# Patient Record
Sex: FEMALE | Race: WHITE | NOT HISPANIC OR LATINO | URBAN - METROPOLITAN AREA
[De-identification: names, ages, dates, MRNs, and addresses within clinical notes are randomized per-mention and may not be internally consistent; named-entity substitution may affect disease eponyms.]

---

## 2023-10-17 ENCOUNTER — APPOINTMENT (OUTPATIENT)
Dept: RHEUMATOLOGY | Facility: CLINIC | Age: 68
End: 2023-10-17
Payer: MEDICARE

## 2023-10-17 PROBLEM — M76.31 CHRONIC ILIOTIBIAL BAND SYNDROME OF RIGHT SIDE: Status: ACTIVE | Noted: 2023-10-17

## 2023-10-17 PROBLEM — L30.9 DERMATITIS, UNSPECIFIED: Status: ACTIVE | Noted: 2023-07-24

## 2023-10-17 PROBLEM — M16.0 BILATERAL HIP JOINT ARTHRITIS: Status: ACTIVE | Noted: 2023-10-17

## 2023-10-17 PROBLEM — S76.011A MUSCLE STRAIN OF RIGHT GLUTEAL REGION: Status: ACTIVE | Noted: 2023-10-17

## 2023-10-17 PROBLEM — S76.211A: Status: ACTIVE | Noted: 2023-10-17

## 2023-10-17 RX ORDER — CETIRIZINE HYDROCHLORIDE 10 MG/1
5 TABLET ORAL DAILY
COMMUNITY

## 2023-10-17 RX ORDER — DULOXETIN HYDROCHLORIDE 20 MG/1
20 CAPSULE, DELAYED RELEASE ORAL EVERY MORNING
COMMUNITY

## 2023-10-17 RX ORDER — TACROLIMUS 1 MG/G
1 OINTMENT TOPICAL
COMMUNITY
Start: 2023-07-24

## 2023-10-17 RX ORDER — BUPROPION HYDROCHLORIDE 150 MG/1
TABLET, FILM COATED, EXTENDED RELEASE ORAL
COMMUNITY

## 2023-10-20 ENCOUNTER — APPOINTMENT (OUTPATIENT)
Dept: DERMATOLOGY | Facility: CLINIC | Age: 68
End: 2023-10-20
Payer: MEDICARE

## 2024-05-06 ENCOUNTER — TELEPHONE (OUTPATIENT)
Dept: PRIMARY CARE | Facility: CLINIC | Age: 69
End: 2024-05-06

## 2024-05-06 ENCOUNTER — OFFICE VISIT (OUTPATIENT)
Dept: PRIMARY CARE | Facility: CLINIC | Age: 69
End: 2024-05-06
Payer: MEDICARE

## 2024-05-06 VITALS
SYSTOLIC BLOOD PRESSURE: 106 MMHG | OXYGEN SATURATION: 96 % | BODY MASS INDEX: 26.22 KG/M2 | DIASTOLIC BLOOD PRESSURE: 59 MMHG | HEIGHT: 63 IN | HEART RATE: 61 BPM | WEIGHT: 148 LBS

## 2024-05-06 DIAGNOSIS — Z12.31 VISIT FOR SCREENING MAMMOGRAM: ICD-10-CM

## 2024-05-06 DIAGNOSIS — M16.0 BILATERAL HIP JOINT ARTHRITIS: ICD-10-CM

## 2024-05-06 DIAGNOSIS — F32.5 MAJOR DEPRESSIVE DISORDER, SINGLE EPISODE IN FULL REMISSION (CMS-HCC): ICD-10-CM

## 2024-05-06 DIAGNOSIS — M17.0 ARTHRITIS OF BOTH KNEES: Primary | Chronic | ICD-10-CM

## 2024-05-06 PROCEDURE — 1159F MED LIST DOCD IN RCRD: CPT | Performed by: INTERNAL MEDICINE

## 2024-05-06 PROCEDURE — 1036F TOBACCO NON-USER: CPT | Performed by: INTERNAL MEDICINE

## 2024-05-06 PROCEDURE — 99205 OFFICE O/P NEW HI 60 MIN: CPT | Performed by: INTERNAL MEDICINE

## 2024-05-06 PROCEDURE — 1160F RVW MEDS BY RX/DR IN RCRD: CPT | Performed by: INTERNAL MEDICINE

## 2024-05-06 RX ORDER — HYDROXYCHLOROQUINE SULFATE 200 MG/1
200 TABLET, FILM COATED ORAL
COMMUNITY

## 2024-05-06 RX ORDER — BUPROPION HYDROCHLORIDE 150 MG/1
150 TABLET, EXTENDED RELEASE ORAL 2 TIMES DAILY
COMMUNITY
Start: 2024-04-16

## 2024-05-06 ASSESSMENT — PATIENT HEALTH QUESTIONNAIRE - PHQ9
2. FEELING DOWN, DEPRESSED OR HOPELESS: NOT AT ALL
1. LITTLE INTEREST OR PLEASURE IN DOING THINGS: NOT AT ALL
SUM OF ALL RESPONSES TO PHQ9 QUESTIONS 1 AND 2: 0

## 2024-05-06 ASSESSMENT — ENCOUNTER SYMPTOMS
LIGHT-HEADEDNESS: 0
DIZZINESS: 0
HEADACHES: 0
WEAKNESS: 0
NUMBNESS: 0
CONSTITUTIONAL NEGATIVE: 1

## 2024-05-06 NOTE — PROGRESS NOTES
"Patient ID: Rachael Wahl is a 69 y.o. female who presents for Mass (On right knee), Joint Pain (Hip and knee joint pain , bilateral), Diarrhea (Ongoing problem, states has food allergies), and Vertigo (Has been going on for 4 years, is being treated, but is still an issue).    /59   Pulse 61   Ht 1.588 m (5' 2.5\")   Wt 67.1 kg (148 lb)   SpO2 96%   BMI 26.64 kg/m²     HPI        I AM HAVING RT HIP PAIN   MORE WORSE THAT THE LEFT HIP   I HAVE BEEN TOLD TO HAVE BAD ARTHRITIS   RT HIP  WORSE THAN THE LEFT   PT HAS ARTHRITIS BOTH HIPS       MY KNEE HURTS   RT WORSE THAN THE LEFT   PT HAS ARTHRITIS BOTH KNEES       PT HAS BEEN TOLD TO HAVE FIBROMYALGIA   PRESCRIBED CYMBALTA WHICH HELPS WITH THE PAIN   NOT TAKING IT NOW , SHE WONDERS , IF THIS MEDICATION   CAN CAUSE SIDE EFFECT        PT HAD DIARRHEA   SHE COULD BE HAVING SOFT STOOL   NO WATERY STOOL   NO BLOOD IN THE STOOL   NOT SURE IF SHE HAS LACTOSE INTOLERANCE   NOT SURE IF SHE HAS GLUTEN INTOLERANCE       SHE HAS QUESTIONS ABOUT VERTIGO   WHICH SHE HAD PERIODICALLY   SEEN NEUROLOGY /ENT , WHICH VERY WELL   COULD BE FROM POSITIONAL VERTIGO     SHE NOTED LOCALIZED   LUMPY AREA  FRONT OF THE RT KNEE   WHICH SEEMS TO BE CYST/LIPOMA       SHE TRIES TO EAT HEALTHY   SHE DOES EXERCISE       NO HX OF HTN       NO HX OF DIABETES         Subjective     Review of Systems   Constitutional: Negative.    HENT:          I GET VERTIGO PERIODICALLY   WHICH IS MANAGEABLE      Musculoskeletal:         BOTH KNEE HURTS   RT MORE THAN THE LEFT     I GET PAIN IN BOTH GROINS   RT MORE THAN THE LEFT    Neurological:  Negative for dizziness, weakness, light-headedness, numbness and headaches.   All other systems reviewed and are negative.      Objective     Physical Exam  Vitals and nursing note reviewed.   Neck:      Vascular: No carotid bruit.   Cardiovascular:      Rate and Rhythm: Normal rate and regular rhythm.      Pulses: Normal pulses.      Heart sounds: Normal heart " sounds.   Pulmonary:      Effort: Pulmonary effort is normal.      Breath sounds: Normal breath sounds.   Musculoskeletal:      Right lower leg: No edema.      Left lower leg: No edema.      Comments: BOTH KNEES COARSE CREPITATIONS NOTED   NO SOFT TISSUE SWELLING   NO ERYTHEMA , NO WARTHM   NO TENDERNESS         RT KNEE CYSTIC LOCALIZED SWELLING NOTED   C/W LIPOMA/CYST       BOTH HIPS EXTERNAL ROTATION LIMITED   INTERNAL ROTATION OK    Lymphadenopathy:      Cervical: No cervical adenopathy.   Neurological:      General: No focal deficit present.      Mental Status: She is oriented to person, place, and time. Mental status is at baseline.   Psychiatric:         Mood and Affect: Mood normal.         Behavior: Behavior normal.         Thought Content: Thought content normal.         Judgment: Judgment normal.         Lab Results   Component Value Date    WBC 5.6 06/19/2023    HGB 13.1 06/19/2023    HCT 38.4 06/19/2023    MCV 91 06/19/2023     06/19/2023           Problem List Items Addressed This Visit       Bilateral hip joint arthritis    Major depressive disorder, single episode in full remission (CMS-HCC)     STABLE ON BUPROPION  STABLE          Arthritis of both knees - Primary     Other Visit Diagnoses       Visit for screening mammogram        Relevant Orders    BI mammo bilateral screening                 A/P         DISCUSSED WITH THE PT , ABOUT JOINT ARTHRITIS   DISCUSSED AND EXPLAINED TO THE PT FIBROMYALGIA   TRIED TO ANSWER HER QUESTIONS AND CONCERNS   NEED TO HAVE MAMMOGRAM DONE   I PLACED THE REQUISITION   FOLLOW UP IF NOTED

## 2024-05-07 NOTE — TELEPHONE ENCOUNTER
Spoke with patient and advised of message   Pt. States that she has had colonoscopies, but states has decided not to continue care here. fyi

## 2024-11-10 ENCOUNTER — HOSPITAL ENCOUNTER (EMERGENCY)
Facility: HOSPITAL | Age: 69
Discharge: HOME | End: 2024-11-11
Attending: EMERGENCY MEDICINE
Payer: MEDICARE

## 2024-11-10 DIAGNOSIS — R09.81 NASAL CONGESTION: Primary | ICD-10-CM

## 2024-11-10 PROCEDURE — 93005 ELECTROCARDIOGRAM TRACING: CPT

## 2024-11-10 PROCEDURE — 99284 EMERGENCY DEPT VISIT MOD MDM: CPT | Mod: 25

## 2024-11-10 RX ORDER — IPRATROPIUM BROMIDE AND ALBUTEROL SULFATE 2.5; .5 MG/3ML; MG/3ML
3 SOLUTION RESPIRATORY (INHALATION)
Status: DISCONTINUED | OUTPATIENT
Start: 2024-11-11 | End: 2024-11-11

## 2024-11-10 ASSESSMENT — PAIN - FUNCTIONAL ASSESSMENT: PAIN_FUNCTIONAL_ASSESSMENT: 0-10

## 2024-11-10 ASSESSMENT — PAIN SCALES - GENERAL: PAINLEVEL_OUTOF10: 0 - NO PAIN

## 2024-11-11 ENCOUNTER — APPOINTMENT (OUTPATIENT)
Dept: CARDIOLOGY | Facility: HOSPITAL | Age: 69
End: 2024-11-11
Payer: MEDICARE

## 2024-11-11 ENCOUNTER — APPOINTMENT (OUTPATIENT)
Dept: RADIOLOGY | Facility: HOSPITAL | Age: 69
End: 2024-11-11
Payer: MEDICARE

## 2024-11-11 VITALS
OXYGEN SATURATION: 100 % | WEIGHT: 138 LBS | RESPIRATION RATE: 18 BRPM | HEART RATE: 68 BPM | DIASTOLIC BLOOD PRESSURE: 71 MMHG | HEIGHT: 63 IN | BODY MASS INDEX: 24.45 KG/M2 | SYSTOLIC BLOOD PRESSURE: 125 MMHG | TEMPERATURE: 99.9 F

## 2024-11-11 LAB
ALBUMIN SERPL BCP-MCNC: 4.1 G/DL (ref 3.4–5)
ALP SERPL-CCNC: 57 U/L (ref 33–136)
ALT SERPL W P-5'-P-CCNC: 33 U/L (ref 7–45)
ANION GAP SERPL CALC-SCNC: 12 MMOL/L (ref 10–20)
AST SERPL W P-5'-P-CCNC: 25 U/L (ref 9–39)
BASOPHILS # BLD AUTO: 0.04 X10*3/UL (ref 0–0.1)
BASOPHILS NFR BLD AUTO: 0.4 %
BILIRUB SERPL-MCNC: 0.3 MG/DL (ref 0–1.2)
BNP SERPL-MCNC: 47 PG/ML (ref 0–99)
BUN SERPL-MCNC: 18 MG/DL (ref 6–23)
CALCIUM SERPL-MCNC: 9.3 MG/DL (ref 8.6–10.3)
CARDIAC TROPONIN I PNL SERPL HS: 6 NG/L (ref 0–13)
CARDIAC TROPONIN I PNL SERPL HS: 7 NG/L (ref 0–13)
CHLORIDE SERPL-SCNC: 107 MMOL/L (ref 98–107)
CO2 SERPL-SCNC: 26 MMOL/L (ref 21–32)
CREAT SERPL-MCNC: 0.63 MG/DL (ref 0.5–1.05)
D DIMER PPP FEU-MCNC: 290 NG/ML FEU
EGFRCR SERPLBLD CKD-EPI 2021: >90 ML/MIN/1.73M*2
EOSINOPHIL # BLD AUTO: 0.2 X10*3/UL (ref 0–0.7)
EOSINOPHIL NFR BLD AUTO: 2.1 %
ERYTHROCYTE [DISTWIDTH] IN BLOOD BY AUTOMATED COUNT: 12.9 % (ref 11.5–14.5)
GLUCOSE SERPL-MCNC: 106 MG/DL (ref 74–99)
HCT VFR BLD AUTO: 39.6 % (ref 36–46)
HGB BLD-MCNC: 13.3 G/DL (ref 12–16)
IMM GRANULOCYTES # BLD AUTO: 0.04 X10*3/UL (ref 0–0.7)
IMM GRANULOCYTES NFR BLD AUTO: 0.4 % (ref 0–0.9)
INR PPP: 1 (ref 0.9–1.1)
LYMPHOCYTES # BLD AUTO: 2.67 X10*3/UL (ref 1.2–4.8)
LYMPHOCYTES NFR BLD AUTO: 28.7 %
MCH RBC QN AUTO: 31.4 PG (ref 26–34)
MCHC RBC AUTO-ENTMCNC: 33.6 G/DL (ref 32–36)
MCV RBC AUTO: 94 FL (ref 80–100)
MONOCYTES # BLD AUTO: 0.59 X10*3/UL (ref 0.1–1)
MONOCYTES NFR BLD AUTO: 6.3 %
NEUTROPHILS # BLD AUTO: 5.77 X10*3/UL (ref 1.2–7.7)
NEUTROPHILS NFR BLD AUTO: 62.1 %
NRBC BLD-RTO: 0 /100 WBCS (ref 0–0)
PLATELET # BLD AUTO: 200 X10*3/UL (ref 150–450)
POTASSIUM SERPL-SCNC: 3.7 MMOL/L (ref 3.5–5.3)
PROT SERPL-MCNC: 6.2 G/DL (ref 6.4–8.2)
PROTHROMBIN TIME: 10.8 SECONDS (ref 9.8–12.8)
RBC # BLD AUTO: 4.23 X10*6/UL (ref 4–5.2)
SODIUM SERPL-SCNC: 141 MMOL/L (ref 136–145)
WBC # BLD AUTO: 9.3 X10*3/UL (ref 4.4–11.3)

## 2024-11-11 PROCEDURE — 36415 COLL VENOUS BLD VENIPUNCTURE: CPT | Performed by: EMERGENCY MEDICINE

## 2024-11-11 PROCEDURE — 94642 AEROSOL INHALATION TREATMENT: CPT

## 2024-11-11 PROCEDURE — 71046 X-RAY EXAM CHEST 2 VIEWS: CPT | Performed by: RADIOLOGY

## 2024-11-11 PROCEDURE — 94640 AIRWAY INHALATION TREATMENT: CPT

## 2024-11-11 PROCEDURE — 85025 COMPLETE CBC W/AUTO DIFF WBC: CPT | Performed by: EMERGENCY MEDICINE

## 2024-11-11 PROCEDURE — 2500000001 HC RX 250 WO HCPCS SELF ADMINISTERED DRUGS (ALT 637 FOR MEDICARE OP): Performed by: EMERGENCY MEDICINE

## 2024-11-11 PROCEDURE — 2500000002 HC RX 250 W HCPCS SELF ADMINISTERED DRUGS (ALT 637 FOR MEDICARE OP, ALT 636 FOR OP/ED): Performed by: EMERGENCY MEDICINE

## 2024-11-11 PROCEDURE — 83880 ASSAY OF NATRIURETIC PEPTIDE: CPT | Performed by: EMERGENCY MEDICINE

## 2024-11-11 PROCEDURE — 85610 PROTHROMBIN TIME: CPT | Performed by: EMERGENCY MEDICINE

## 2024-11-11 PROCEDURE — 71046 X-RAY EXAM CHEST 2 VIEWS: CPT

## 2024-11-11 PROCEDURE — 80053 COMPREHEN METABOLIC PANEL: CPT | Performed by: EMERGENCY MEDICINE

## 2024-11-11 PROCEDURE — 84484 ASSAY OF TROPONIN QUANT: CPT | Performed by: EMERGENCY MEDICINE

## 2024-11-11 PROCEDURE — 85379 FIBRIN DEGRADATION QUANT: CPT | Performed by: EMERGENCY MEDICINE

## 2024-11-11 RX ORDER — OXYMETAZOLINE HCL 0.05 %
2 SPRAY, NON-AEROSOL (ML) NASAL EVERY 12 HOURS PRN
Status: DISCONTINUED | OUTPATIENT
Start: 2024-11-11 | End: 2024-11-11 | Stop reason: HOSPADM

## 2024-11-11 RX ORDER — IPRATROPIUM BROMIDE AND ALBUTEROL SULFATE 2.5; .5 MG/3ML; MG/3ML
3 SOLUTION RESPIRATORY (INHALATION) EVERY 2 HOUR PRN
Status: DISCONTINUED | OUTPATIENT
Start: 2024-11-11 | End: 2024-11-11

## 2024-11-11 RX ORDER — PREDNISONE 20 MG/1
40 TABLET ORAL ONCE
Status: DISCONTINUED | OUTPATIENT
Start: 2024-11-11 | End: 2024-11-11

## 2024-11-11 NOTE — PROGRESS NOTES
Pt refuses to take any breathing tx's ordered at this time. No SOB complaints, just stated she is congested. No distress noted.

## 2024-11-11 NOTE — ED TRIAGE NOTES
Patient came from home feeling SOB. She states it has happened before about a year ago. No cough. She is A&Ox4.

## 2024-11-11 NOTE — DISCHARGE INSTRUCTIONS
You were seen emergency room today for evaluation of nasal congestion difficulty breathing.  Your symptoms improved with Afrin.  You can use Afrin every 12 hours as noted on bottle for 3 days.  Please do not use for more than 3 days as discussed.  Your blood work was reassuring.  Please continue Mucinex as prescribed and your Flonase.  Please return to the emergency room if you have any chest pain shortness breath, worsening symptoms or if you have other concerns.

## 2024-11-11 NOTE — ED PROVIDER NOTES
History/Exam limitations: none.   Additional history was obtained from patient.          HPI:    Rachael Wahl is a 69 y.o. female PMH hyperlipidemia, osteoarthritis, seasonal allergies presenting for evaluation of shortness of breath and nasal congestion.  Patient states that over the last couple weeks has been having intermittent episodes of shortness of breath where she wakes up at night short of breath.  She states that a few days ago she had some right flank lateral chest wall discomfort that was pleuritic which is now resolved.  She states she has had continued episodes of shortness of breath.  She states that she also has nasal congestion she is taking Flonase as she typically does not start taking Mucinex recently.  States that her nasal congestion feels like it is potentially contributing to shortness of breath.  No active chest pain.  No pleuritic symptoms currently.  No chest pressure.  No headache, vision changes, fever, chills.  No productive cough.        Physical Exam:  ED Triage Vitals   Temperature Heart Rate Respirations BP   11/10/24 2302 11/10/24 2302 11/10/24 2302 11/10/24 2302   37.7 °C (99.9 °F) 64 18 161/76      Pulse Ox Temp Source Heart Rate Source Patient Position   11/10/24 2302 11/10/24 2302 11/10/24 2319 --   99 % Temporal Monitor       BP Location FiO2 (%)     -- --              GEN:      Alert, NAD  Eyes:       PERRL, EOMI  HENT:      NC/AT, OP clear, airway patent, MM, nasal congestion with no purulent drainage, no epistaxis  CV:      RRR, no MRG, no LE pitting edema, 2+ radial and pedal pulses  PULM:     CTAB, no w/r/r, easy WOB, symmetric chest rise  ABD:      Soft, NT, ND, no masses, BS +  NEURO:   A&Ox3, no focal deficits    MSK:      FROM, no joint deformities or swelling, no e/o trauma  SKIN:       Warm and dry  PSYCH:    Appropriate mood and affect         MDM/ED Course:   Rachael Wahl is a 69 y.o. female PMH hyperlipidemia, osteoarthritis, seasonal allergies  presenting for evaluation of shortness of breath and nasal congestion.  Vitals and exam as documented above.  Patient reports she feels as if her shortness of breath is related to her nasal congestion and reports prior episodes similar.  Differential includes pneumonia, bronchitis, viral URI, pulmonary edema/CHF, pulmonary embolism.  Unlikely ACS given lack of chest pain or exertional symptoms, quality of symptoms.  IV placed and labs drawn.  Chest x-ray obtained and no acute findings per radiology read.  CBC with no leukocytosis or significant anemia.  Low risk Wells, D-dimer negative unlikely PE.  BNP normal, no evidence of volume overload on exam, less likely CHF.  Chemistry reassuring.  Troponin negative x 2 unlikely ACS.  Patient was given Afrin and reported marked improvement in symptoms.  Plan to continue Afrin as needed twice daily for no more than 3 days.  Patient felt comfortable with discharge and very well-appearing on reassessment.  Patient was explained findings, exam/study results, the importance of follow up and the plan moving forward; patient verbalized understanding and agreement. All questions were answered and no new questions at this time. Patient will be discharged with strict return precautions, follow up plan with PCP.    EKG reviewed by me: 11:04 PM sinus bradycardia, rate 59, normal axis, normal intervals, no STEMI, no ectopy, similar to prior EKG from June 2023.     Diagnoses as of 11/14/24 1050   Nasal congestion         Chronic medical conditions affecting care listed in MDM. I independently reviewed imaging studies and agreed with radiology reads. I reviewed recent and relevant outside records including PCP notes, Prior discharge summaries, and prior radiology reports.    Procedure  Procedures    Diagnosis:   1.  Nasal congestion    Dispo: Discharged in stable condition      Disclaimer: Portions of this note were dictated by speech recognition. An attempt at proof reading was made to  minimize errors. Minor errors in transcription may be present.  Please call if questions.     Valentino Shore MD  11/14/24 0166

## 2024-11-15 LAB
ATRIAL RATE: 59 BPM
P AXIS: 38 DEGREES
P OFFSET: 188 MS
P ONSET: 145 MS
PR INTERVAL: 158 MS
Q ONSET: 224 MS
QRS COUNT: 10 BEATS
QRS DURATION: 86 MS
QT INTERVAL: 414 MS
QTC CALCULATION(BAZETT): 409 MS
QTC FREDERICIA: 411 MS
R AXIS: 27 DEGREES
T AXIS: 43 DEGREES
T OFFSET: 431 MS
VENTRICULAR RATE: 59 BPM

## 2025-01-08 VITALS
WEIGHT: 138 LBS | TEMPERATURE: 97.3 F | SYSTOLIC BLOOD PRESSURE: 115 MMHG | OXYGEN SATURATION: 100 % | BODY MASS INDEX: 23.56 KG/M2 | HEIGHT: 64 IN | DIASTOLIC BLOOD PRESSURE: 62 MMHG | HEART RATE: 69 BPM | RESPIRATION RATE: 18 BRPM

## 2025-01-08 PROCEDURE — 99284 EMERGENCY DEPT VISIT MOD MDM: CPT | Performed by: EMERGENCY MEDICINE

## 2025-01-08 ASSESSMENT — PAIN DESCRIPTION - PAIN TYPE: TYPE: ACUTE PAIN

## 2025-01-08 ASSESSMENT — PAIN SCALES - GENERAL
PAINLEVEL_OUTOF10: 4

## 2025-01-08 ASSESSMENT — PAIN DESCRIPTION - LOCATION
LOCATION: LEG
LOCATION_2: KNEE
LOCATION_3: FOOT

## 2025-01-08 ASSESSMENT — PAIN DESCRIPTION - ORIENTATION
ORIENTATION: LEFT
ORIENTATION_2: LEFT
ORIENTATION_3: LEFT

## 2025-01-08 ASSESSMENT — COLUMBIA-SUICIDE SEVERITY RATING SCALE - C-SSRS
1. IN THE PAST MONTH, HAVE YOU WISHED YOU WERE DEAD OR WISHED YOU COULD GO TO SLEEP AND NOT WAKE UP?: NO
6. HAVE YOU EVER DONE ANYTHING, STARTED TO DO ANYTHING, OR PREPARED TO DO ANYTHING TO END YOUR LIFE?: NO
2. HAVE YOU ACTUALLY HAD ANY THOUGHTS OF KILLING YOURSELF?: NO

## 2025-01-08 ASSESSMENT — PAIN DESCRIPTION - DESCRIPTORS
DESCRIPTORS_2: ACHING;THROBBING
DESCRIPTORS_3: NUMBNESS;THROBBING;ACHING
DESCRIPTORS: ACHING;THROBBING

## 2025-01-08 ASSESSMENT — PAIN - FUNCTIONAL ASSESSMENT: PAIN_FUNCTIONAL_ASSESSMENT: 0-10

## 2025-01-09 ENCOUNTER — APPOINTMENT (OUTPATIENT)
Dept: RADIOLOGY | Facility: HOSPITAL | Age: 70
End: 2025-01-09
Payer: MEDICARE

## 2025-01-09 ENCOUNTER — HOSPITAL ENCOUNTER (EMERGENCY)
Facility: HOSPITAL | Age: 70
Discharge: HOME | End: 2025-01-09
Attending: EMERGENCY MEDICINE
Payer: MEDICARE

## 2025-01-09 DIAGNOSIS — S82.892A CLOSED AVULSION FRACTURE OF LEFT ANKLE, INITIAL ENCOUNTER: Primary | ICD-10-CM

## 2025-01-09 PROCEDURE — 73502 X-RAY EXAM HIP UNI 2-3 VIEWS: CPT | Mod: LT

## 2025-01-09 PROCEDURE — 2500000001 HC RX 250 WO HCPCS SELF ADMINISTERED DRUGS (ALT 637 FOR MEDICARE OP): Performed by: EMERGENCY MEDICINE

## 2025-01-09 PROCEDURE — 72131 CT LUMBAR SPINE W/O DYE: CPT

## 2025-01-09 PROCEDURE — 73610 X-RAY EXAM OF ANKLE: CPT | Mod: LT

## 2025-01-09 PROCEDURE — 73564 X-RAY EXAM KNEE 4 OR MORE: CPT | Mod: LT

## 2025-01-09 PROCEDURE — 73630 X-RAY EXAM OF FOOT: CPT | Mod: LEFT SIDE | Performed by: SURGERY

## 2025-01-09 PROCEDURE — 73502 X-RAY EXAM HIP UNI 2-3 VIEWS: CPT | Mod: LEFT SIDE | Performed by: SURGERY

## 2025-01-09 PROCEDURE — 73564 X-RAY EXAM KNEE 4 OR MORE: CPT | Mod: LEFT SIDE | Performed by: SURGERY

## 2025-01-09 PROCEDURE — 73630 X-RAY EXAM OF FOOT: CPT | Mod: LT

## 2025-01-09 PROCEDURE — 72131 CT LUMBAR SPINE W/O DYE: CPT | Performed by: RADIOLOGY

## 2025-01-09 PROCEDURE — 73610 X-RAY EXAM OF ANKLE: CPT | Mod: LEFT SIDE | Performed by: SURGERY

## 2025-01-09 RX ORDER — ACETAMINOPHEN 325 MG/1
975 TABLET ORAL ONCE
Status: COMPLETED | OUTPATIENT
Start: 2025-01-09 | End: 2025-01-09

## 2025-01-09 RX ADMIN — ACETAMINOPHEN 975 MG: 325 TABLET, FILM COATED ORAL at 01:50

## 2025-01-09 ASSESSMENT — PAIN SCALES - GENERAL
PAINLEVEL_OUTOF10: 3
PAINLEVEL_OUTOF10: 5 - MODERATE PAIN

## 2025-01-09 ASSESSMENT — PAIN DESCRIPTION - ORIENTATION
ORIENTATION: LEFT
ORIENTATION: LEFT

## 2025-01-09 ASSESSMENT — PAIN - FUNCTIONAL ASSESSMENT: PAIN_FUNCTIONAL_ASSESSMENT: 0-10

## 2025-01-09 ASSESSMENT — PAIN DESCRIPTION - LOCATION
LOCATION: LEG
LOCATION: LEG

## 2025-01-09 NOTE — ED PROVIDER NOTES
HPI   Chief Complaint   Patient presents with    Fall       Patient presents with a fall today to her backside.  She states she had mechanical fall when onto the buttock and into she got a broom came back and may have had no on the feet and slipped.  Fell backwards in her left leg got caught behind her.  She already has patient seen sciatica and back trouble.  This was a low back and below.  She denies any pain in her torso head or neck.  She denies any loss consciousness.  She is previously in her usual state health no fever cough vomiting.  She does not want a muscle relaxers.              Patient History   No past medical history on file.  No past surgical history on file.  No family history on file.  Social History     Tobacco Use    Smoking status: Never    Smokeless tobacco: Never   Substance Use Topics    Alcohol use: Not on file    Drug use: Not on file       Physical Exam   ED Triage Vitals [01/08/25 2220]   Temperature Heart Rate Respirations BP   36.3 °C (97.3 °F) 69 18 115/62      Pulse Ox Temp Source Heart Rate Source Patient Position   100 % Temporal Monitor --      BP Location FiO2 (%)     -- --       Physical Exam  Vitals and nursing note reviewed.   Constitutional:       General: She is not in acute distress.     Appearance: She is well-developed.   HENT:      Head: Normocephalic and atraumatic.   Eyes:      Conjunctiva/sclera: Conjunctivae normal.   Cardiovascular:      Rate and Rhythm: Normal rate and regular rhythm.      Heart sounds: No murmur heard.  Pulmonary:      Effort: Pulmonary effort is normal. No respiratory distress.      Breath sounds: Normal breath sounds.   Abdominal:      Palpations: Abdomen is soft.      Tenderness: There is no abdominal tenderness.   Musculoskeletal:         General: No swelling. Normal range of motion.      Cervical back: Neck supple.   Skin:     General: Skin is warm and dry.      Capillary Refill: Capillary refill takes less than 2 seconds.   Neurological:       Mental Status: She is alert.   Psychiatric:         Mood and Affect: Mood normal.           ED Course & MDM   Diagnoses as of 01/11/25 0549   Closed avulsion fracture of left ankle, initial encounter                 No data recorded     Eric Coma Scale Score: 15 (01/08/25 2225 : Candelario Hansen RN)                           Medical Decision Making  The patient has a benign exam.  Intact sensation and strength and range of motion of her leg.  While I doubt that she has any lumbar fractures given her full range of motion I did obtain x-rays of these areas given her age.  They also obtain a CT scan of her lower spine.  Did not CT of the head and neck given she denies trauma to these areas.  She denies any blood thinners.  She is asking for something for muscle stiffness but does not want to try muscle relaxers.  Patient had a possible avulsion fracture of her navicular bone.  Patient placed in a boot and given crutches for comfort.  Patient to follow-up with orthopedics.    Procedure  Procedures     Edilson Pérez MD  01/11/25 0558

## 2025-01-09 NOTE — ED TRIAGE NOTES
Pt from home c/o pain on her back, left leg, knee and foot. Pt stated that she slipped and fell hitting her left side. Pt not sure if she hit her head. Pt denies loc and not on blood thinners.

## 2025-01-17 ENCOUNTER — APPOINTMENT (OUTPATIENT)
Dept: ORTHOPEDIC SURGERY | Facility: CLINIC | Age: 70
End: 2025-01-17
Payer: MEDICARE

## 2025-01-27 ENCOUNTER — OFFICE VISIT (OUTPATIENT)
Dept: ORTHOPEDIC SURGERY | Facility: HOSPITAL | Age: 70
End: 2025-01-27
Payer: MEDICARE

## 2025-01-27 DIAGNOSIS — S93.402A MILD SPRAIN OF LEFT ANKLE, INITIAL ENCOUNTER: Primary | ICD-10-CM

## 2025-01-27 DIAGNOSIS — M17.12 ARTHRITIS OF KNEE, LEFT: ICD-10-CM

## 2025-01-27 DIAGNOSIS — S82.892A CLOSED AVULSION FRACTURE OF LEFT ANKLE, INITIAL ENCOUNTER: ICD-10-CM

## 2025-01-27 DIAGNOSIS — M54.16 LUMBAR RADICULOPATHY: ICD-10-CM

## 2025-01-27 PROCEDURE — 99214 OFFICE O/P EST MOD 30 MIN: CPT | Performed by: ORTHOPAEDIC SURGERY

## 2025-01-27 PROCEDURE — 99204 OFFICE O/P NEW MOD 45 MIN: CPT | Performed by: ORTHOPAEDIC SURGERY

## 2025-01-27 NOTE — PROGRESS NOTES
HISTORY OF PRESENT ILLNESS  This is a pleasant 70 y.o. year old female  who presents on 01/27/2025 at the request of Stefani Castellon MD for evaluation of left ankle pain that has been present over/since 3 weeks.    How the condition occurred or started: step on balcony, slipped and left foot and ankle twisted. She also has chronic issues with radiating pain into legs and numbness in some toes of both feet and worsens at night time- hx LS spine issues.  No bowel or bladder incontinence.    Location of pain (patient points to): no foot or ankle pain.  Left knee anterior pain, crepitus.  No left knee swelling.  Previous Treatment: ED placed her in boot and she took it off after a few days as she was feeling fine.    PHYSICAL EXAMINATION  Constitutional Exam: patient's height and weight reviewed, well-kempt  Psychiatric Exam: alert and oriented x 3, appropriate mood and behavior  Eye Exam: ANNE, EOMI  Pulmonary Exam: breathing non-labored, no apparent distress  Lymphatic exam: no appreciable lymphadenopathy in the lower extremities  Cardiovascular exam: DP pulses 2+ bilaterally, PT 2+ bilaterally, toes are pink with good capillary refill, no pitting edema  Skin exam: no open lesions, rashes, abrasions or ulcerations  Neurological exam: sensation to light touch intact in both lower extremities in peripheral and dermatomal distributions (except for any abnormalities noted in musculoskeletal exam)    Musculoskeletal exam: left knee: no effusion, full ROM, full extension with no lag, negative duc, patellar crepitus mild, stable ligamentous exam, no bony tenderness to palpation  Left foot and ankle: no pain to palpation over navicular, no pain to palpation over ATFL or CFL, stable negative anterior drawer and talar tilt test, neg DF-eversion stress test, no ankle effusion, motor intact for DF/PF/INV/EV    DATA/RESULTS REVIEW: I personally reviewed the patient's x-ray images and reports of the left knee showing  "early arthrosis and patellar spur, no acute findings or fractures.  I personally reviewed the patient's xray images and reports of left ankle/foot showing dorsal calcification/spur with no fracture line and chronic in appearance.     ASSESSMENT: left foot and ankle doing well, resolved minor sprain.  No pain over navicular area and no avulsion fracture present.  Left knee arthrosis and lumbar radiculopathy exacerbation  PLAN: I discussed with the patient the differential diagnosis, complex traumatic related nature of the condition(s) and available treatment option(s).  Her left foot and ankle is doing well, in regular shoes.  She does require PT to improve lumbar and knee condition due to exacerbation of underlying arthrosis.  Wrote out Rx for PT.  Referral made to medical spine due to her lumbar radiculopathy.  FUV prn.  The patient's questions were answered in detail.      Note dictated with Anaphore software, completed without full type editing to avoid delay.      Dear Referring Physician,  It was my pleasure to see your patient, in the office today.  Please refer to the detailed notes above for my findings and recommendations.  Thank you once again for your consultation request.  If you have any further questions or concerns, please feel free to contact me via email or at the phone number and address below.  Sincerely,    Chayito Castro MD   of Orthopedic Surgery, [employer]  Dual Fellowship-trained in Orthopedic Sports Medicine (Knee and Shoulder), and Foot and Ankle Surgery  [facility]   [employer] Subspecialty Certificate in Orthopedic Sports Medicine  Head Team Physician Case \"[place]\" and [facility] \"[employer]\"  [employer] USOP Physician [id number] Par[employer]pic Games  [employer] Figure Skating Medical Practitioner, including International Event Coverage  Director, Foot and Ankle Division, " Department of Orthopedics    [address], [place] [id number]  [email or web address]  Office [phone]

## 2025-01-31 ENCOUNTER — OFFICE VISIT (OUTPATIENT)
Dept: ORTHOPEDIC SURGERY | Facility: CLINIC | Age: 70
End: 2025-01-31
Payer: MEDICARE

## 2025-01-31 DIAGNOSIS — M54.16 LUMBAR RADICULOPATHY: ICD-10-CM

## 2025-01-31 DIAGNOSIS — M79.18 ACUTE MYOFASCIAL PAIN: ICD-10-CM

## 2025-01-31 DIAGNOSIS — M47.816 LUMBAR SPONDYLOSIS: Primary | ICD-10-CM

## 2025-01-31 PROCEDURE — 1159F MED LIST DOCD IN RCRD: CPT | Performed by: STUDENT IN AN ORGANIZED HEALTH CARE EDUCATION/TRAINING PROGRAM

## 2025-01-31 PROCEDURE — 99214 OFFICE O/P EST MOD 30 MIN: CPT | Performed by: STUDENT IN AN ORGANIZED HEALTH CARE EDUCATION/TRAINING PROGRAM

## 2025-01-31 PROCEDURE — 99204 OFFICE O/P NEW MOD 45 MIN: CPT | Performed by: STUDENT IN AN ORGANIZED HEALTH CARE EDUCATION/TRAINING PROGRAM

## 2025-01-31 PROCEDURE — 1125F AMNT PAIN NOTED PAIN PRSNT: CPT | Performed by: STUDENT IN AN ORGANIZED HEALTH CARE EDUCATION/TRAINING PROGRAM

## 2025-01-31 RX ORDER — MELOXICAM 15 MG/1
15 TABLET ORAL DAILY
Qty: 30 TABLET | Refills: 11 | Status: SHIPPED | OUTPATIENT
Start: 2025-01-31 | End: 2026-01-31

## 2025-01-31 ASSESSMENT — PAIN - FUNCTIONAL ASSESSMENT: PAIN_FUNCTIONAL_ASSESSMENT: 0-10

## 2025-01-31 ASSESSMENT — PAIN SCALES - GENERAL: PAINLEVEL_OUTOF10: 7

## 2025-01-31 NOTE — PROGRESS NOTES
Assessment     Rachael is a 70 y.o. female with significant past medical history of depression, fibromyalgia, chronic knee pain, who presents with acute on chronic low back pain . The patient's symptoms, clinical exam and imaging studies are suggestive of spondylosis, myofascial pain. The other possible differential diagnosis(es) include(s):  disc herniation, fibromyalgia, and muscle strain.      Plan     At this time, I would like to make the following recommendation/plan:  -  Physical Therapy: Order placed to start physical therapy   -  Medication: Prescription sent to start meloxicam prn  -  Injections: None at this time  -  Studies: Interpreted: CT Lumbar spine: no acute fracture, multilevel spondylosis, decreased disk space between L5-S1  -  Referrals: physical therapy      Follow up:  Follow up in 6 weeks.    Subjective    Chief Complaint: low back pain    History of Present Illness:  Rachael Wahl is a 70 y.o. female, with pertinent PMH of depression, fibromyalgia, chronic knee pain, who presents today for low back pain.  She pain first started many years ago, without inciting event. The pain as located in the low back, glutes, lateral hips. Pain was well controlled until recent fall beginning of January. Overall has been improving.     Pain:        Severity:  Rachael Wahl states pain is: 6/10 at it's worst. On average pain is 2-5/10 (Scale 0-no pain, 10-worst pain)      Quality:  aching and burning.       Radiating: Lateral left hip>R to right knee but feels like they may be separate, numbness in left toes      Aggravating Factors: walking, laying on left side, going up stairs      Alleviating Factors:  rest, ice, heat      Time of day: in the morning      Associated symptoms:  intermittent numbness in toes, No tingling in the LE; no. No balance problems (states from vertigo).     Physical Therapy/Occupational Therapy/Other Modalities:    - Chiropractor/OMT: None  - Acupuncture: None  - Medical  message: Yes    Last PT session: 5-6 months ago  # of sessions completed: >10 sessions     Topicals:   ICE/Heat: Has tried with pain relief   Topicals (Capsicin/Diclofenac gel/Salonpas/Lidocaine): Voltaren     Medications:   - Over the counter : (Tylenol, NSAIDs)  Aleve- does not help  - Steroid: None  - Gabapentin/Lyrica: None  - Muscle relaxer: None  - Duloxetine/Topamax/oxcarbazepine: Previously Cymbalta- did help        Current Outpatient Medications:     buPROPion (Zyban) 150 mg 12 hr tablet, , Disp: , Rfl:     buPROPion SR (Wellbutrin SR) 150 mg 12 hr tablet, Take 1 tablet (150 mg) by mouth 2 times a day., Disp: , Rfl:     cetirizine (ZyrTEC) 10 mg tablet, Take 0.5 tablets (5 mg) by mouth once daily., Disp: , Rfl:     DULoxetine (Cymbalta) 20 mg DR capsule, Take 1 capsule (20 mg) by mouth once daily in the morning., Disp: , Rfl:     hydroxychloroquine (Plaquenil) 200 mg tablet, Take 1 tablet (200 mg) by mouth. Takes twice a week, Disp: , Rfl:     IVERMECTIN ORAL, Take by mouth. Takes once a week, Disp: , Rfl:     tacrolimus (Protopic) 0.1 % ointment, 1 Application., Disp: , Rfl:     Allergies   Allergen Reactions    Meperidine (Pf) Nausea/vomiting    Penicillins Unknown    Clindamycin Rash    Sulfa (Sulfonamide Antibiotics) Rash       ROS:  - Sleep: Sleep is disrupted secondary to pain  - Bowel/Bladder: Denies bowel/bladder dysfunction  - Falls: Denies fall  - Weight changes: Denies  - Mood/Psych: Some feelings of anxiety or depression    12-point review of systems was completed and is otherwise negative except as noted in the HPI.      Social Hx:  - Home: Lives with developmentally delayed son in a house.   - ADLs: Independent in ADLs and ambulation without assistive device.   - Hobbies: Gardening, travel   - Work:  Retired  - Smoking/Alcohol/Drugs: No    Objective     Physical Exam:  General:  Appears state age, in NAD, and well nourished  Psychological:  Normal mood and affect  Pulm:  Breathing comfortably  on RA    Gait:   - Slightly kyphotic   - Normal based  - Without assistive device (Cane/walker/Rolator/etc)  - able to heel walk, able to toe walk    Inspection:   - No erythema, swelling/edema, ecchymosis or deformity noted  - normal muscle bulk of bilateral lower extremity     Sensation:  - Decreased sensation to left foot, left lower lateral leg, otherwise intact     Palpation:  - No tenderness to palpation of bilateral greater trochanter  - Tenderness of left IT band  - No tenderness to palpation of bilateral sacroiliac joint  - No tenderness to palpation of bilateral spinal paraspinals at the level of L1-S1  - No tenderness to palpation of spinous process at the level of L1-S1  - No tenderness to palpation of gluteal muscles     Range of Motion:  - Full painless thoracolumbar flexion/extension/rotation/sidebending  - Full painless bilateral hip flexion/internal rotation/external rotation    Strength:  Side Hip Flexion (L2) Knee Extension (L3) Ankle Dorsiflexion (L4) Great Toe extension (L5) Ankle Plantarflexion  (S1)     Right 5 5 5 5 5     Left 5 5 5 5 5       Reflexes:  Side Patellar (L4) Achilles (S1)  Medial hamstring(L5)   Right 2+ 2+ NT   Left 2+ 2+ NT     Special tests:  - Disc/Nerve root: (femoral stretch, SLR): negative  - Facet loading: negative  - SI Joint (Compression, Distraction, Gaenslen, and Thigh thrust): negative  - ARLIN: negative, although ROM limited  - FADIR: None     Imaging and Other Studies:    CT lumbar spine wo IV contrast    Result Date: 1/9/2025  Interpreted By:  Taina Murillo, STUDY: CT LUMBAR SPINE WO IV CONTRAST;  1/9/2025 1:48 am   INDICATION: Signs/Symptoms:fall to back.   COMPARISON: None.   ACCESSION NUMBER(S): [id number]   ORDERING CLINICIAN: JOVANNI FERRELL   TECHNIQUE: Axial noncontrast images of the lumbar spine with coronal and sagittal reconstructed images.   FINDINGS: ALIGNMENT: 1 retrolisthesis of L3 on 4. VERTEBRAE: No acute fracture. Diffuse osteopenia. Loss of  disc height and endplate sclerosis T12-L1 and L5-S1 with vacuum disc phenomena. Mild anterior osteophyte formation is noted at multiple levels. Facet hypertrophic changes are present in the mid and lower lumbar spine. SPINAL CANAL: No critical spinal canal stenosis. Disc spur complex at T12-L1, L1-L2, L4-5 and L5-S1 mild canal narrowing. PREVERTEBRAL SOFT TISSUES: No prevertebral soft tissue swelling.   OTHER FINDINGS: None.       No acute fracture or traumatic subluxation of the lumbar spine.   Multilevel discogenic degenerative changes as noted above.     MACRO: None   Signed by: Taina Murillo 1/9/2025 2:00 AM Dictation workstation:   [id number]    XR ankle left 3+ views    Result Date: 1/9/2025  Interpreted By:  Forrest Lyons, STUDY: XR ANKLE LEFT 3+ VIEWS; XR FOOT LEFT 3+ VIEWS; ;  1/9/2025 1:13 am; 1/9/2025 1:14 am   INDICATION: Signs/Symptoms:fall; Signs/Symptoms:pt c/o left foot pain after already at XR.     COMPARISON: None.   ACCESSION NUMBER(S): [id number]; [id number]   ORDERING CLINICIAN: JOVANNI FERRELL   FINDINGS: Three views each of the left ankle and foot.   There is a tiny ossific fragment seen along the dorsal cortex of the navicular bone mild lateral ankle and foot radiography with adjacent soft tissue swelling is suspicious for acute minimally displaced avulsion/chip fracture in the appropriate clinical context. Please correlate with mechanism of injury and point tenderness.   No other evidence of acute fracture or malalignment in the left foot or ankle. No osteolysis or aggressive periostitis.   Soft tissues otherwise appear within normal limits.       Please see above.     MACRO: None   Signed by: Forrest Lyons 1/9/2025 1:26 AM Dictation workstation:   [id number]    XR foot left 3+ views    Result Date: 1/9/2025  Interpreted By:  Forrest Lyons, STUDY: XR ANKLE LEFT 3+ VIEWS; XR FOOT LEFT 3+ VIEWS; ;  1/9/2025 1:13 am; 1/9/2025 1:14 am   INDICATION: Signs/Symptoms:fall; Signs/Symptoms:pt  c/o left foot pain after already at XR.     COMPARISON: None.   ACCESSION NUMBER(S): [id number]; [id number]   ORDERING CLINICIAN: JOVANNI FERRELL   FINDINGS: Three views each of the left ankle and foot.   There is a tiny ossific fragment seen along the dorsal cortex of the navicular bone mild lateral ankle and foot radiography with adjacent soft tissue swelling is suspicious for acute minimally displaced avulsion/chip fracture in the appropriate clinical context. Please correlate with mechanism of injury and point tenderness.   No other evidence of acute fracture or malalignment in the left foot or ankle. No osteolysis or aggressive periostitis.   Soft tissues otherwise appear within normal limits.       Please see above.     MACRO: None   Signed by: Forrest Lyons 1/9/2025 1:26 AM Dictation workstation:   [id number]    XR knee left 4+ views    Result Date: 1/9/2025  Interpreted By:  Forrest Lyons, STUDY: XR KNEE LEFT 4+ VIEWS; ;  1/9/2025 1:13 am   INDICATION: Signs/Symptoms:fall.     COMPARISON: None.   ACCESSION NUMBER(S): [id number]   ORDERING CLINICIAN: JOVANNI FERRELL   FINDINGS: Four views of the left knee.   No acute fracture or malalignment. Minor medial and patellofemoral osteoarthropathy. No joint effusion. Soft tissues appear within normal limits.       No evidence of acute osseous abnormality in the left knee.     MACRO: None   Signed by: Forrest Lyons 1/9/2025 1:24 AM Dictation workstation:   [id number]    XR hip left with pelvis when performed 2 or 3 views    Result Date: 1/9/2025  Interpreted By:  Forrest Lyons, STUDY: XR HIP LEFT WITH PELVIS WHEN PERFORMED 2 OR 3 VIEWS; ;  1/9/2025 1:12 am   INDICATION: Signs/Symptoms:fall.     COMPARISON: Pelvic radiography of 06/06/2023.   ACCESSION NUMBER(S): [id number]   ORDERING CLINICIAN: JOVANNI FERRELL   FINDINGS: AP view of the pelvis and two views of the left hip.   No acute fracture or malalignment. Mild bilateral osteoarthropathy and osteitis  pubis and degenerative changes of the sacroiliac joints. Soft tissues appear within normal limits. Lumbar facet osteoarthropathy. Surgical clips in the pelvis.       No evidence of acute osseous abnormality in the pelvis/left hip.     MACRO: None   Signed by: Forrest Lyons 1/9/2025 1:23 AM Dictation workstation:   [id number]

## 2025-03-21 ENCOUNTER — APPOINTMENT (OUTPATIENT)
Dept: ORTHOPEDIC SURGERY | Facility: CLINIC | Age: 70
End: 2025-03-21
Payer: MEDICARE